# Patient Record
Sex: FEMALE | Race: WHITE | NOT HISPANIC OR LATINO | Employment: FULL TIME | URBAN - METROPOLITAN AREA
[De-identification: names, ages, dates, MRNs, and addresses within clinical notes are randomized per-mention and may not be internally consistent; named-entity substitution may affect disease eponyms.]

---

## 2024-08-22 PROCEDURE — 81379 HLA I TYPING COMPLETE HR: CPT | Mod: OUT | Performed by: INTERNAL MEDICINE

## 2024-08-28 ENCOUNTER — LAB REQUISITION (OUTPATIENT)
Dept: LAB | Facility: CLINIC | Age: 29
End: 2024-08-28
Payer: COMMERCIAL

## 2024-08-28 DIAGNOSIS — Z52.3 BONE MARROW DONOR: ICD-10-CM

## 2024-08-28 LAB
HLA CLS I TYP PNL BLD/T DONR HIGH RES: NORMAL
HLA RESULTS: NORMAL
HLA-DP2 QL: NORMAL
HLA-DQB1 HIGH RES: NORMAL
HLA-DRB1 HIGH RES: NORMAL

## 2025-02-28 ENCOUNTER — HOSPITAL ENCOUNTER (OUTPATIENT)
Dept: RADIOLOGY | Facility: CLINIC | Age: 30
Discharge: HOME | End: 2025-02-28
Payer: COMMERCIAL

## 2025-02-28 DIAGNOSIS — Z34.90 ENCOUNTER FOR SUPERVISION OF NORMAL PREGNANCY, UNSPECIFIED, UNSPECIFIED TRIMESTER: ICD-10-CM

## 2025-02-28 PROCEDURE — 76811 OB US DETAILED SNGL FETUS: CPT

## 2025-05-01 ENCOUNTER — DOCUMENTATION (OUTPATIENT)
Dept: MATERNAL FETAL MEDICINE | Facility: HOSPITAL | Age: 30
End: 2025-05-01
Payer: COMMERCIAL

## 2025-05-01 NOTE — PROGRESS NOTES
The patient attended the Gestational Diabetes Self-Management VIRTUAL Group Education Program    Overview of Diabetes    Type 1    Type 2    Gestational       -Risks to baby and Mom  Self-monitoring     Tips for Testing/troubleshooting glucometers    Goal range for blood sugars (<95 fasting, <140 1 hour postprandial for all meals)    Record Keeping    Blood Sugar Line    Blood Sugar Log Sheets - provided  Managing Diabetes     Physical Activity - recommendation is about 150 minutes per week    Medication        Oral/insulin overview  Additional  testing     NST/BPP/Growth ultrasound - general overview  Postpartum     Expectations in the hospital    Follow up - recommend fasting, 75g OGGT, 6-8 weeks after delivery     50% change of developing GDM in another pregnancy    50% chance of developing T2DM within next 10 years    Up to 30% of patients will have pre-diabetes or T2DM following delivery       Breastfeeding is strongly encouraged   Special considerations, self-management    Hypoglycemia    Hyperglycemia    Sick Day Rules  Resilience training/stress management    Engage your senses    Gratitude practice  Emotional support     “Baby Blues”    Postpartum Depression       When to call for help  Nutrition planning     Identify carbohydrates, serving size, carbohydrate counting    “Free Foods” - very low carbohydrate options    Label Reading    Personalized Meal Plan     3 meals + 3 snacks = approximately 175g carbohydrates/day    Follow up for 1:1 Registered Dietician consult       [phone] to schedule appointment    Individual consult with Maternal Fetal Medicine provider is scheduled.

## 2025-05-08 ENCOUNTER — HOSPITAL ENCOUNTER (OUTPATIENT)
Dept: RADIOLOGY | Facility: CLINIC | Age: 30
Discharge: HOME | End: 2025-05-08
Payer: COMMERCIAL

## 2025-05-08 DIAGNOSIS — Z34.90 ENCOUNTER FOR SUPERVISION OF NORMAL PREGNANCY, UNSPECIFIED, UNSPECIFIED TRIMESTER: ICD-10-CM

## 2025-05-08 PROCEDURE — 76819 FETAL BIOPHYS PROFIL W/O NST: CPT

## 2025-05-08 PROCEDURE — 76816 OB US FOLLOW-UP PER FETUS: CPT

## 2025-05-15 ENCOUNTER — INITIAL PRENATAL (OUTPATIENT)
Dept: MATERNAL FETAL MEDICINE | Facility: CLINIC | Age: 30
End: 2025-05-15
Payer: COMMERCIAL

## 2025-05-15 DIAGNOSIS — O24.414 INSULIN CONTROLLED GESTATIONAL DIABETES MELLITUS (GDM) IN THIRD TRIMESTER (HHS-HCC): Primary | ICD-10-CM

## 2025-05-15 DIAGNOSIS — Z3A.31 31 WEEKS GESTATION OF PREGNANCY (HHS-HCC): ICD-10-CM

## 2025-05-15 PROCEDURE — 99214 OFFICE O/P EST MOD 30 MIN: CPT | Mod: 95 | Performed by: OBSTETRICS & GYNECOLOGY

## 2025-05-15 PROCEDURE — 99204 OFFICE O/P NEW MOD 45 MIN: CPT | Performed by: OBSTETRICS & GYNECOLOGY

## 2025-05-15 ASSESSMENT — ENCOUNTER SYMPTOMS
CONFUSION: 0
WEIGHT LOSS: 0
VISUAL CHANGE: 0
POLYPHAGIA: 0
WEAKNESS: 0
HUNGER: 1
HEADACHES: 0
TREMORS: 0
SWEATS: 0
BLACKOUTS: 0
SEIZURES: 0
SPEECH DIFFICULTY: 0
BLURRED VISION: 0
FATIGUE: 0
POLYDIPSIA: 1
NERVOUS/ANXIOUS: 0
DIZZINESS: 0

## 2025-05-15 NOTE — ASSESSMENT & PLAN NOTE
Patient was recently diagnosed with gestational diabetes (GDM).  We discussed the pregnancy implications of the diagnosis including increased risk of pre-eclampsia, LGA/macrosomia, shoulder dystocia, stillbirth as well as  hypoglycemia, hyperbilirubinemia and respiratory distress.  We reviewed the importance of glycemic control and its impact on lowering these risks.  Discussed management ranging from dietary changes to pharmacotherapy and that insulin is considered first line therapy rather than oral agents if medication is ultimately needed.  Discussed our recommendation for serial growth ultrasounds and starting  testing at 32 weeks if treatment is required.  We also stressed the potential persistence of impaired glucose tolerance post pregnancy in up to 30% of patients and 50% risk of IGT/T2DM in the next 10 years with an overall lifetime risk of T2DM of 70%. We reviewed the recommendation for postpartum screening at 6 weeks post-partum (can be performed as soon as 2 days after delivery) and, if normal, routine screening every 1-3 years. We did discuss that a healthy diet and maintenance of a normal body weight may reduce those risks    In summary the following is recommended:    1. We will continue to co-manage diabetes via the Bloodsugar line with weekly assessment of glycemic control.  Recommend starting NPH 10qhs however patient concerned about how this will affect her job (patient is a ). She is planning to reach out to her job and then will follow-up.  2. We will plan for a follow up MD visit at 36 weeks to assess overall control and provide delivery timing recommendations.  3. Recommend serial growth ultrasounds every 4 weeks.  4. Weekly  testing is recommended starting at 32 weeks.  Twice weekly testing is recommended at 32 weeks if control is suboptimal, there is polyhydramnios, or there is a LGA growth pattern.  5. Delivery is recommended at 39 weeks, though if glycemic  control is suboptimal then delivery at 37-39 weeks may be considered.  6. If the EFW is >4500g at the time of delivery  should be considered.  7. A 2hr GTT is recommended 6 weeks postpartum (can be performed as soon as 2 days postpartum), if normal then screening for T2DM is recommended at least every 3 years.

## 2025-05-15 NOTE — PROGRESS NOTES
5/15/2025   Deidre Murphy     [facility] CONSULT NOTE  Referring Clinician: Dr. Elizabeth Taylor  Reason for consult: GDM    HPI: Deidre Murphy is a 29 y.o.  at 31w1d here for consult for newly diagnosed GDM.     Started taking her blood sugars 2 weeks ago and have been as follows:  Fasting 88 - 118   Breakfast 125 - 165  (had pancakes when elevated)  Lunch 126 - 150   Dinner 102 - 148  (had burger and fries)  10 point review of system is negative except as above    OB History          1    Para        Term                AB        Living             SAB        IAB        Ectopic        Multiple        Live Births                       Past medical history: Denies HTN, asthma, depression, or thyroid issues    Surgical History[1]    Medications: PNV, bASA    RX Allergies[2]    Social History[3]    family history is not on file.      OBJECTIVE  Visit Vitals  LMP 10/09/2024 (Exact Date)   OB Status Pregnant       Physical exam  Virtual visit  General: no acute distress  HEENT: normocephalic, atraumatic  Neuro: awake and conversant  Psych: appropriate mood and affect    ASSESSMENT & PLAN    Deidre Murphy is a 29 y.o.  at 31w1d here for the following:    Assessment & Plan  Insulin controlled gestational diabetes mellitus (GDM) in third trimester ([facility])  Patient was recently diagnosed with gestational diabetes (GDM).  We discussed the pregnancy implications of the diagnosis including increased risk of pre-eclampsia, LGA/macrosomia, shoulder dystocia, stillbirth as well as  hypoglycemia, hyperbilirubinemia and respiratory distress.  We reviewed the importance of glycemic control and its impact on lowering these risks.  Discussed management ranging from dietary changes to pharmacotherapy and that insulin is considered first line therapy rather than oral agents if medication is ultimately needed.  Discussed our recommendation for serial growth ultrasounds and starting   testing at 32 weeks if treatment is required.  We also stressed the potential persistence of impaired glucose tolerance post pregnancy in up to 30% of patients and 50% risk of IGT/T2DM in the next 10 years with an overall lifetime risk of T2DM of 70%. We reviewed the recommendation for postpartum screening at 6 weeks post-partum (can be performed as soon as 2 days after delivery) and, if normal, routine screening every 1-3 years. We did discuss that a healthy diet and maintenance of a normal body weight may reduce those risks    In summary the following is recommended:    1. We will continue to co-manage diabetes via the Bloodsugar line with weekly assessment of glycemic control.  Recommend starting NPH 10qhs however patient concerned about how this will affect her job (patient is a ). She is planning to reach out to her job and then will follow-up.  2. We will plan for a follow up MD visit at 36 weeks to assess overall control and provide delivery timing recommendations.  3. Recommend serial growth ultrasounds every 4 weeks.  4. Weekly  testing is recommended starting at 32 weeks.  Twice weekly testing is recommended at 32 weeks if control is suboptimal, there is polyhydramnios, or there is a LGA growth pattern.  5. Delivery is recommended at 39 weeks, though if glycemic control is suboptimal then delivery at 37-39 weeks may be considered.  6. If the EFW is >4500g at the time of delivery  should be considered.  7. A 2hr GTT is recommended 6 weeks postpartum (can be performed as soon as 2 days postpartum), if normal then screening for T2DM is recommended at least every 3 years.      RTC at 36 weeks for Growth US and delivery planning  Seen and discussed with Dr. Blanca Craig MD PGY-3         [1]   Past Surgical History:  Procedure Laterality Date    ESOPHAGOGASTRODUODENOSCOPY     [2] Not on File  [3]

## 2025-05-22 ENCOUNTER — NUTRITION (OUTPATIENT)
Dept: OBSTETRICS AND GYNECOLOGY | Facility: CLINIC | Age: 30
End: 2025-05-22
Payer: COMMERCIAL

## 2025-05-22 ENCOUNTER — PATIENT MESSAGE (OUTPATIENT)
Dept: MATERNAL FETAL MEDICINE | Facility: CLINIC | Age: 30
End: 2025-05-22
Payer: COMMERCIAL

## 2025-05-22 DIAGNOSIS — O24.414 INSULIN CONTROLLED GESTATIONAL DIABETES MELLITUS (GDM) IN THIRD TRIMESTER (HHS-HCC): ICD-10-CM

## 2025-05-22 RX ORDER — BLOOD-GLUCOSE SENSOR
EACH MISCELLANEOUS
Qty: 3 EACH | Refills: 11 | Status: SHIPPED | OUTPATIENT
Start: 2025-05-22

## 2025-05-23 NOTE — PROGRESS NOTES
"Nutrition Follow Up Assessment:     Patient Deidre Murphy is a 29 y.o. female being seen via virtual visit, phone call only (video option not available) who was referred for gestational diabetes.     Nutrition Assessment    Problem List[1]  Nutrition History:  Food & Nutrition History:   Pt attended Virtual GDM Bootcamp. 1:1 follow-up today.   Per pt:  FB, 103, 112, 113  Dinner at 9pm, bed at 1111:30p.     Dinner usually low carb--approx 1 serving of carb (~15g CHO): meat with cauliflower rice and carrots or 1/2 sweet potato and carrots. Postprandial BG avg 126. Reports improved FBG after higher carb dinner that included [place] rice and low-carb tortillas.     Bedtime snack of 1/2 apple w/PB after class on  when dinner is very early.     Anthropometrics:  Weight History:   Daily Weight  No data found for Wt      Nutrition Focused Physical Exam Findings:  Deferred, appointment is either virtual or phone only    Nutrition Significant Labs:  Vitamin D: No results found for: \"VITD25\"     **Optimal vitamin D level in pregnancy: Greater than or equal to 40ng/mL. Recommend blood draw to assess. If vitamin D levels are found to be low, recommend the highest safe pregnancy dosage of 4000 IU D3 daily. Take in the morning to avoid interference with sleep.     Medications:  Current Outpatient Medications   Medication Instructions    blood-glucose sensor (Dexcom G7 Sensor) device Use 1 sensor and change every 10 days        Estimated Needs:  Calorie Needs  *Tracking prenatal weight gain is the recommended method of determining adequacy of caloric intake.*    1st trimester EER = Nonpregnant EER + 0 kcal   2nd trimester EER = Nonpregnant EER + 340 kcal ( 400 kcal if underweight pre-pregnancy)  3rd trimester EER = Nonpregnant EER + 452 kcal  (600 kcal if underweight)    (Plus additional 150 kcals/day avg. for multifetal pregnancy)    Protein Needs: Minimum 80g protein/day    Nutrition Diagnosis     Nutrition " "Diagnosis  Patient has Nutrition Diagnosis: Yes  Additional Assessment Information (1): Altered nutrition-related laboratory values (diagnostic, fingerstick or CGM data) r/t diabetes in pregnancy, as evidenced by fasting and/or postprandial blood glucose levels outside of target ranges.      Nutrition Interventions/Recommendations     Nutrition Interventions:   Food and Nutrient Delivery:    General Recommendations     Daily carbohydrate distribution:     Breakfast: 1-2 carbohydrate servings (15-30 grams of carbohydrate) -- avoid concentrated sweets/high GI foods: fruit, fruit juices, cold cereal and milk, syrup, jams and jellies, etc.   AM Snack: 1-2 servings   Lunch: 3-4 servings (45-60g)   Midday Snack: 1-2 servings   Dinner: 3-4 servings   HS Snack: 1-2 servings (15-30g)     -Be sure to add protein food to carb choices at each meal and snack: cheese, meat, eggs, lower sugar Greek yogurt, cottage cheese, nuts/seeds, peanut butter, etc     -During the day, eat about every 2 - 4 hours. Avoid very long periods without eating.     -Avoid more than 8-10 hours overnight without eating. If more than about 2 hours between dinner and bedtime, have a carb plus protein snack. Ex: Tricuits and cheese or a half-sandwich on whole wheat bread.     -Higher fiber carbohydrates/whole grains are recommended over refined carb choices. Ex: 100% whole wheat breads, brown rice, Triscuits, popcorn. Including non-starchy vegetables at meals can help manage blood sugars by adding more fiber as well.     -Avoid juices and other sugar-sweetened/carb-containing beverages. Diet and \"zero\" drinks are fine. Try eliminating milk at mealtime if noticing after-meal BG elevations. Unsweetened almond or soymilk beverages are a lower-carb dairy alternative.     -Recommended Dietary Allowance for carbohydrates during pregnancy is a minimum of 175g/day (11-12 servings) to provide 33g/day for fetal brain development. Many women do well eating a bit less " than 175g CHO/day--this is fine, as long as there is no purposeful, severe restriction of carb foods at meals and snacks (ex: Keto or Atkins-style diets for BG control) and no symptoms indicating inadequate carb intake-- loss of weight, feeling dizzy/lightheaded, irritable, confused, excessively hungry, etc.    The following discussed with and emailed to Deidre:    Along with your protein food, try including at least around 20-30 grams of higher-fiber carb food at dinner. Ex: brown rice, quinoa, Banza or 100% whole wheat pasta, 100% whole wheat bread or Phil bread. (See attached info.)    Adding some fat is fine too-- butter on your sweet potato, sour cream with your Mexican food, an oil and vinegar dressing over your veggies.    If there will be more than 2 hours between dinner and bedtime, have a carb and protein snack within about an hour of bedtime.      Nutrition Education Materials emailed:  Carbohydrate Recommendations and Serving Sizes    Nutrition Monitoring and Evaluation     Biochemical Data, Medical Tests and Procedures  Monitoring and Evaluation Plan: Glucose/endocrine profile  Glucose/Endocrine Profile: Glucose, fasting, Other (Comment)  Criteria: Fasting BG 95 or less; 1hr post-meal  or less. [facility]. Follow-up as needed to assess goal attainment. Modifications based on further assessment and self-blood glucose monitoring results. Pt expresses understanding and agreement.         [1]   Patient Active Problem List  Diagnosis    Insulin controlled gestational diabetes mellitus (GDM) in third trimester ([facility])

## 2025-06-05 ENCOUNTER — PATIENT MESSAGE (OUTPATIENT)
Dept: MATERNAL FETAL MEDICINE | Facility: CLINIC | Age: 30
End: 2025-06-05
Payer: COMMERCIAL

## 2025-06-06 ENCOUNTER — HOSPITAL ENCOUNTER (OUTPATIENT)
Dept: RADIOLOGY | Facility: CLINIC | Age: 30
Discharge: HOME | End: 2025-06-06
Payer: COMMERCIAL

## 2025-06-06 DIAGNOSIS — Z34.90 ENCOUNTER FOR SUPERVISION OF NORMAL PREGNANCY, UNSPECIFIED, UNSPECIFIED TRIMESTER: ICD-10-CM

## 2025-06-06 PROCEDURE — 76816 OB US FOLLOW-UP PER FETUS: CPT

## 2025-06-06 PROCEDURE — 76819 FETAL BIOPHYS PROFIL W/O NST: CPT

## 2025-06-24 ENCOUNTER — PATIENT MESSAGE (OUTPATIENT)
Dept: MATERNAL FETAL MEDICINE | Facility: HOSPITAL | Age: 30
End: 2025-06-24
Payer: COMMERCIAL

## 2025-06-26 ENCOUNTER — HOSPITAL ENCOUNTER (OUTPATIENT)
Dept: RADIOLOGY | Facility: CLINIC | Age: 30
Discharge: HOME | End: 2025-06-26
Payer: COMMERCIAL

## 2025-06-26 ENCOUNTER — ROUTINE PRENATAL (OUTPATIENT)
Dept: MATERNAL FETAL MEDICINE | Facility: CLINIC | Age: 30
End: 2025-06-26
Payer: COMMERCIAL

## 2025-06-26 ENCOUNTER — DOCUMENTATION (OUTPATIENT)
Dept: MATERNAL FETAL MEDICINE | Facility: CLINIC | Age: 30
End: 2025-06-26
Payer: COMMERCIAL

## 2025-06-26 VITALS — SYSTOLIC BLOOD PRESSURE: 122 MMHG | WEIGHT: 222.5 LBS | HEART RATE: 86 BPM | DIASTOLIC BLOOD PRESSURE: 78 MMHG

## 2025-06-26 DIAGNOSIS — Z34.90 ENCOUNTER FOR SUPERVISION OF NORMAL PREGNANCY, UNSPECIFIED, UNSPECIFIED TRIMESTER: ICD-10-CM

## 2025-06-26 DIAGNOSIS — O24.414 INSULIN CONTROLLED GESTATIONAL DIABETES MELLITUS (GDM) IN THIRD TRIMESTER (HHS-HCC): Primary | ICD-10-CM

## 2025-06-26 DIAGNOSIS — Z3A.37 37 WEEKS GESTATION OF PREGNANCY (HHS-HCC): ICD-10-CM

## 2025-06-26 LAB — GLUCOSE BLD MANUAL STRIP-MCNC: 124 MG/DL (ref 74–99)

## 2025-06-26 PROCEDURE — 76816 OB US FOLLOW-UP PER FETUS: CPT

## 2025-06-26 PROCEDURE — 99215 OFFICE O/P EST HI 40 MIN: CPT | Performed by: STUDENT IN AN ORGANIZED HEALTH CARE EDUCATION/TRAINING PROGRAM

## 2025-06-26 PROCEDURE — 99215 OFFICE O/P EST HI 40 MIN: CPT | Mod: GC | Performed by: STUDENT IN AN ORGANIZED HEALTH CARE EDUCATION/TRAINING PROGRAM

## 2025-06-26 PROCEDURE — 76819 FETAL BIOPHYS PROFIL W/O NST: CPT

## 2025-06-26 PROCEDURE — 82947 ASSAY GLUCOSE BLOOD QUANT: CPT | Performed by: STUDENT IN AN ORGANIZED HEALTH CARE EDUCATION/TRAINING PROGRAM

## 2025-06-26 RX ORDER — PEN NEEDLE, DIABETIC 30 GX3/16"
NEEDLE, DISPOSABLE MISCELLANEOUS
Qty: 200 EACH | Refills: 3 | Status: SHIPPED | OUTPATIENT
Start: 2025-06-26

## 2025-06-26 RX ORDER — ISOPROPYL ALCOHOL 70 ML/100ML
SWAB TOPICAL
Qty: 200 EACH | Refills: 3 | Status: SHIPPED | OUTPATIENT
Start: 2025-06-26

## 2025-06-26 RX ORDER — BLOOD-GLUCOSE METER
EACH MISCELLANEOUS
COMMUNITY
Start: 2025-05-21

## 2025-06-26 RX ORDER — LANCETS 30 GAUGE
EACH MISCELLANEOUS
COMMUNITY
Start: 2025-04-29

## 2025-06-26 RX ORDER — ISOPROPYL ALCOHOL 70 ML/100ML
SWAB TOPICAL
Qty: 200 EACH | Refills: 3 | Status: SHIPPED | OUTPATIENT
Start: 2025-06-26 | End: 2025-06-26

## 2025-06-26 RX ORDER — HUMAN INSULIN 100 [IU]/ML
INJECTION, SUSPENSION SUBCUTANEOUS
Qty: 15 ML | Refills: 3 | Status: SHIPPED | OUTPATIENT
Start: 2025-06-26 | End: 2025-06-26

## 2025-06-26 RX ORDER — HUMAN INSULIN 100 [IU]/ML
INJECTION, SUSPENSION SUBCUTANEOUS
Qty: 15 ML | Refills: 3 | Status: SHIPPED | OUTPATIENT
Start: 2025-06-26

## 2025-06-26 ASSESSMENT — PATIENT HEALTH QUESTIONNAIRE - PHQ9
SUM OF ALL RESPONSES TO PHQ9 QUESTIONS 1 AND 2: 0
1. LITTLE INTEREST OR PLEASURE IN DOING THINGS: NOT AT ALL
2. FEELING DOWN, DEPRESSED OR HOPELESS: NOT AT ALL

## 2025-06-26 ASSESSMENT — ENCOUNTER SYMPTOMS
CARDIOVASCULAR NEGATIVE: 0
MUSCULOSKELETAL NEGATIVE: 0
ENDOCRINE NEGATIVE: 0
CONSTITUTIONAL NEGATIVE: 0
ALLERGIC/IMMUNOLOGIC NEGATIVE: 0
NEUROLOGICAL NEGATIVE: 0
PSYCHIATRIC NEGATIVE: 0
RESPIRATORY NEGATIVE: 0
HEMATOLOGIC/LYMPHATIC NEGATIVE: 0
GASTROINTESTINAL NEGATIVE: 0
EYES NEGATIVE: 0

## 2025-06-26 ASSESSMENT — PAIN - FUNCTIONAL ASSESSMENT: PAIN_FUNCTIONAL_ASSESSMENT: 0-10

## 2025-06-26 ASSESSMENT — PAIN SCALES - GENERAL: PAINLEVEL_OUTOF10: 2

## 2025-06-26 NOTE — PROGRESS NOTES
Insulin Pen Education   Maternal Fetal Medicne team recommend begin 8 units At Bedtime   Here with     Able to demonstrate/describe:     Wash hands      Check label - verify correct medication   Gentle mix (NPH)   Attach new needle each time   Safety test (prime pen, ensure needle working properly)    Select correct dose   Self injection - R abd.  No med admin   Site selection & rotation   Remove needle   Insulin Storage   Needle disposal    Review Hypoglycemia    Causes   Sign & symptoms   Oral Treatment    Patient appears to have good understanding and is engaged in self-care.  Encouraged to call for questions or concerns    Given printed education materials  Plans f/u in 4 days and then weekly for BGM support.    Has log sheets and/or contact information.

## 2025-06-26 NOTE — PROGRESS NOTES
"MFM Follow-up  2025   3:43 PM     SUBJECTIVE    HPI: Deidre Murphy is a 29 y.o.  at 37w1d here for RPNV. Endorses some very rare contractions, mostly at night. Enough to wake her up but not severely painful. Denies any bleeding or LOF. Reports normal fetal movement.     Patient reports dietary changes to help with her blood sugars - prioritizing high fiber complex carb + protein at night to help control her fasting sugars. Combining rice/potatoes 50/50 with cauliflower to limit carbs. Baby has been AGA at growth , last 34%ile    BG log  Fastin-106  Breakfast: 100-140  Lunch:   Dinner:     OBJECTIVE    Visit Vitals  /78   Pulse 86   Wt 101 kg (222 lb 8 oz)   LMP 10/09/2024 (Exact Date)   OB Status Pregnant   Smoking Status Never      Physical Exam  General: Sitting in NAD  Neuro: Awake, alert, conversational  CV: Warm and well perfused   Respiratory: Even and unlabored on RA  Extremities: Full ROM  Psych: appropriate mood and affect    FHT: growth US later today    ASSESSMENT & PLAN    Deidre Murphy is a 29 y.o.  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 May increase to 50 units total per day during pregnancy as needed.    alcohol swabs; Use 1, up to 5 times a day    Patient seen and evaluated with Dr. Patria Omalley MD, PGY-1  Maternal-Fetal Medicine  "

## 2025-06-26 NOTE — ASSESSMENT & PLAN NOTE
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 May increase to 50 units total per day during pregnancy as needed.    alcohol swabs; Use 1, up to 5 times a day    "

## 2025-06-30 DIAGNOSIS — O24.414 INSULIN CONTROLLED GESTATIONAL DIABETES MELLITUS (GDM) IN THIRD TRIMESTER (HHS-HCC): ICD-10-CM

## 2025-06-30 RX ORDER — PEN NEEDLE, DIABETIC 30 GX3/16"
NEEDLE, DISPOSABLE MISCELLANEOUS
Qty: 50 EACH | Refills: 1 | Status: SHIPPED | OUTPATIENT
Start: 2025-06-30

## 2025-06-30 RX ORDER — HUMAN INSULIN 100 [IU]/ML
INJECTION, SUSPENSION SUBCUTANEOUS
Qty: 3 ML | Refills: 1 | Status: SHIPPED | OUTPATIENT
Start: 2025-06-30

## 2025-07-09 ENCOUNTER — PATIENT MESSAGE (OUTPATIENT)
Dept: MATERNAL FETAL MEDICINE | Facility: CLINIC | Age: 30
End: 2025-07-09
Payer: COMMERCIAL